# Patient Record
Sex: FEMALE | URBAN - METROPOLITAN AREA
[De-identification: names, ages, dates, MRNs, and addresses within clinical notes are randomized per-mention and may not be internally consistent; named-entity substitution may affect disease eponyms.]

---

## 2023-08-29 ENCOUNTER — TELEPHONE (OUTPATIENT)
Age: 63
End: 2023-08-29

## 2023-08-29 NOTE — TELEPHONE ENCOUNTER
Patients GI provider:  Dr. Murali Hooper    Number to return call: [phone]    Reason for call: Pt calling to schedule a colon consult. There has been a problem when entering insurances.      Scheduled procedure/appointment date if applicable: [id number]